# Patient Record
Sex: FEMALE | Race: WHITE | NOT HISPANIC OR LATINO | Employment: FULL TIME | URBAN - METROPOLITAN AREA
[De-identification: names, ages, dates, MRNs, and addresses within clinical notes are randomized per-mention and may not be internally consistent; named-entity substitution may affect disease eponyms.]

---

## 2017-04-24 ENCOUNTER — PATIENT OUTREACH (OUTPATIENT)
Dept: ADMINISTRATIVE | Facility: HOSPITAL | Age: 22
End: 2017-04-24

## 2017-05-03 ENCOUNTER — OFFICE VISIT (OUTPATIENT)
Dept: FAMILY MEDICINE | Facility: CLINIC | Age: 22
End: 2017-05-03
Payer: COMMERCIAL

## 2017-05-03 VITALS
HEART RATE: 85 BPM | OXYGEN SATURATION: 98 % | TEMPERATURE: 98 F | HEIGHT: 65 IN | DIASTOLIC BLOOD PRESSURE: 68 MMHG | SYSTOLIC BLOOD PRESSURE: 122 MMHG | BODY MASS INDEX: 43.16 KG/M2 | WEIGHT: 259.06 LBS

## 2017-05-03 DIAGNOSIS — Z00.00 ROUTINE HEALTH MAINTENANCE: Primary | ICD-10-CM

## 2017-05-03 PROCEDURE — 99395 PREV VISIT EST AGE 18-39: CPT | Mod: S$GLB,,, | Performed by: FAMILY MEDICINE

## 2017-05-04 NOTE — PROGRESS NOTES
Patient ID: Heather Yanez is a 21 y.o. female.    Chief Complaint: Annual Exam    HPI      Heather Yanez is a 21 y.o. female. here for annual exam.   No current complaints.  Had visit with OB earlier Today.  Literally no complaints.  Occasionally use the gym, but losing more sedentary lifestyle with fiancé.      Review of Symptoms    Constitutional: Negative.    HENT: Negative.    Eyes: Negative.    Respiratory: Negative.    Cardiovascular: Negative.    Gastrointestinal: Negative.    Endocrine: Negative.    Genitourinary: Negative.    Musculoskeletal: Negative.    Skin: Negative.    Allergic/Immunologic: Negative.    Neurological: Negative.    Hematological: Negative.    Psychiatric/Behavioral: Negative.      Except as above in HPI        Physical  Exam    Constitutional:  Oriented to person, place, and time. Appears well-developed and well-nourished.     HENT:   Head: Normocephalic and atraumatic.     Right Ear: Tympanic membrane, external ear and ear canal normal.     Left Ear: Tympanic membrane, external ear and ear canal normal.     Nose: Nose normal. No rhinorrhea or nasal deformity.     Mouth/Throat: Uvula is midline, oropharynx is clear and moist and mucous membranes are normal.      Eyes: Conjunctivae are normal. Right eye exhibits no discharge. Left eye exhibits no discharge. No scleral icterus.     Neck:  No JVD present. No tracheal deviation  [x]  Neck supple.         Cardiovascular: Normal rate, regular rhythm and normal heart sounds.      Pulmonary/Chest: Effort normal and breath sounds normal. No stridor. No respiratory distress. No wheezes. No rales.      Musculoskeletal: Normal range of motion. No edema or tenderness.   No deformity     Lymphadenopathy:  No cervical adenopathy.     Neurological:  Alert and oriented to person, place, and time. Coordination normal.     Skin: Skin is warm and dry. No rash noted.     Psychiatric: Normal mood and affect. Speech is normal and behavior is normal.  Judgment and thought content normal.       Abdominal exam done by GYN      Complete Blood Count  Lab Results   Component Value Date    RBC 4.48 11/19/2015    HGB 12.6 11/19/2015    HCT 37.6 11/19/2015    MCV 84 11/19/2015    MCH 28.1 11/19/2015    MCHC 33.5 11/19/2015    RDW 12.2 11/19/2015     11/19/2015    MPV 10.8 11/19/2015    GRAN 5.0 11/19/2015    GRAN 59.8 11/19/2015    LYMPH 2.4 11/19/2015    LYMPH 28.5 11/19/2015    MONO 0.7 11/19/2015    MONO 7.9 11/19/2015    EOS 0.3 11/19/2015    BASO 0.03 11/19/2015    EOSINOPHIL 3.2 11/19/2015    BASOPHIL 0.4 11/19/2015    DIFFMETHOD Automated 11/19/2015       Comprehensive Metabolic Panel  Lab Results   Component Value Date    GLU 91 11/19/2015    BUN 12 11/19/2015    CREATININE 0.8 11/19/2015     11/19/2015    K 4.3 11/19/2015     11/19/2015    PROT 7.7 11/19/2015    ALBUMIN 3.9 11/19/2015    BILITOT 0.8 11/19/2015    AST 24 11/19/2015    ALKPHOS 76 11/19/2015    CO2 24 11/19/2015    ALT 26 11/19/2015    ANIONGAP 10 11/19/2015    EGFRNONAA >60.0 11/19/2015    ESTGFRAFRICA >60.0 11/19/2015       TSH  Lab Results   Component Value Date    TSH 1.578 11/19/2015       Assessment / Plan:      ICD-10-CM ICD-9-CM   1. Routine health maintenance Z00.00 V70.0       Discussed how to stay healthy including: diet, exercise, refraining from smoking and discussed screening exams / tests needed for age, sex and family Hx.